# Patient Record
Sex: FEMALE | Race: ASIAN | NOT HISPANIC OR LATINO | URBAN - METROPOLITAN AREA
[De-identification: names, ages, dates, MRNs, and addresses within clinical notes are randomized per-mention and may not be internally consistent; named-entity substitution may affect disease eponyms.]

---

## 2020-11-18 PROBLEM — Z00.00 ENCOUNTER FOR PREVENTIVE HEALTH EXAMINATION: Status: ACTIVE | Noted: 2020-11-18

## 2020-11-19 ENCOUNTER — OUTPATIENT (OUTPATIENT)
Dept: OUTPATIENT SERVICES | Facility: HOSPITAL | Age: 65
LOS: 1 days | End: 2020-11-19

## 2020-11-19 ENCOUNTER — TRANSCRIPTION ENCOUNTER (OUTPATIENT)
Age: 65
End: 2020-11-19

## 2020-11-19 ENCOUNTER — APPOINTMENT (OUTPATIENT)
Dept: GASTROENTEROLOGY | Facility: CLINIC | Age: 65
End: 2020-11-19

## 2020-11-19 DIAGNOSIS — K62.3 RECTAL PROLAPSE: ICD-10-CM

## 2020-11-19 DIAGNOSIS — K21.9 GASTRO-ESOPHAGEAL REFLUX DISEASE W/OUT ESOPHAGITIS: ICD-10-CM

## 2020-11-19 DIAGNOSIS — K60.3 ANAL FISTULA: ICD-10-CM

## 2020-11-23 PROBLEM — K21.9 CHRONIC GERD: Status: ACTIVE | Noted: 2020-11-19

## 2020-11-23 PROBLEM — K62.3 RECTAL PROLAPSE: Status: ACTIVE | Noted: 2020-11-23

## 2020-11-23 PROBLEM — K60.3 FISTULA, ANAL: Status: ACTIVE | Noted: 2020-11-19

## 2021-03-08 ENCOUNTER — APPOINTMENT (OUTPATIENT)
Dept: GASTROENTEROLOGY | Facility: CLINIC | Age: 66
End: 2021-03-08

## 2021-04-16 NOTE — REASON FOR VISIT
[Home] : at home, [unfilled] , at the time of the visit. [Medical Office: ([facility])___] : at the medical office located in  [[employer] ] : provided by [employer]   [Verbal consent obtained from patient] : the patient, [unfilled] [Initial Evaluation] : an initial evaluation [Family Member] : family member [FreeTextEntry4] :  ID [id number] [TWNoteComboBox1] : Vicki [FreeTextEntry1] : rectal prolapse, GERD

## 2021-04-16 NOTE — HISTORY OF PRESENT ILLNESS
[Heartburn] : stable heartburn [Nausea] : denies nausea [Vomiting] : denies vomiting [Diarrhea] : denies diarrhea [Constipation] : denies constipation [Yellow Skin Or Eyes (Jaundice)] : denies jaundice [Abdominal Pain] : denies abdominal pain [Abdominal Swelling] : denies abdominal swelling [Rectal Pain] : denies rectal pain [Wt Loss ___ Lbs] : no recent weight loss [Inflammatory Bowel Disease] : no inflammatory bowel disease [Irritable Bowel Syndrome] : no irritable bowel syndrome [Diverticulitis] : no diverticulitis [de-identified] : 64 F w/ Hx HTN, HLD, GERD, hysterectomy who presents to establish care for rectal pain/pus.\par \par Reports she is having "boils" which are painful, exude pus and blood from the rectum. Has not seen anyone for it 2/2 issues with insurance. \par \par Endorses daily heartburn/reflux after meals, for which she takes daily omeprazole. Eats spicy foods which triggers her symptoms. \par \par Had a colonoscopy 2 years ago (was having rectal boils then as well), endorses described as a fistula in the region of "boils and pus." \par \par Denies abd pain, n/vd/f/c. Denies family Hx of CRC, gastric, breast, ovarian, pancreatic. \par  [FreeTextEntry1] : 64 F w/ Hx HTN, HLD, GERD, hysterectomy who presents to establish care for rectal pain/pus.\par \par #Rectal pain/pus: pt told since approx 2 years she has a fistula, which was to be operated on however could not do so 2/2 insurance issues. Continues to experience significant discomfort from rectum w/ active discharge. \par #GERD: takes PPI, experiences reflux after meals. No Hx of EGD in the past. \par \par PLAN: \par - Referral to colorectal surgery for ?rectal fistula (unable to examine via telephone visit)\par - EGD after colorectal evaluation for GERD in 63 y/o F \par \par \par Fuad Campoverde MD\par GI Fellow, PGY4

## 2021-04-16 NOTE — END OF VISIT
[Time Spent: ___ minutes] : I have spent [unfilled] minutes of time on the encounter. [] : Fellow [FreeTextEntry3] : As modified and discussed with patient\par MD ELIZABETH Dumont FACPiedmont [facility]\par Associate Professor of Medicine\par [employer] [facility] [employer] [facility] [employer]\par